# Patient Record
Sex: FEMALE | Race: BLACK OR AFRICAN AMERICAN | NOT HISPANIC OR LATINO | URBAN - METROPOLITAN AREA
[De-identification: names, ages, dates, MRNs, and addresses within clinical notes are randomized per-mention and may not be internally consistent; named-entity substitution may affect disease eponyms.]

---

## 2017-01-01 ENCOUNTER — OUTPATIENT (OUTPATIENT)
Dept: OUTPATIENT SERVICES | Facility: HOSPITAL | Age: 57
LOS: 1 days | End: 2017-01-01
Payer: MEDICAID

## 2017-01-31 DIAGNOSIS — R69 ILLNESS, UNSPECIFIED: ICD-10-CM

## 2017-07-01 PROCEDURE — G9001: CPT

## 2019-11-22 ENCOUNTER — EMERGENCY (EMERGENCY)
Facility: HOSPITAL | Age: 59
LOS: 0 days | Discharge: HOME | End: 2019-11-22
Payer: MEDICAID

## 2019-11-22 VITALS
TEMPERATURE: 98 F | OXYGEN SATURATION: 99 % | HEIGHT: 63 IN | DIASTOLIC BLOOD PRESSURE: 71 MMHG | HEART RATE: 87 BPM | WEIGHT: 154.98 LBS | SYSTOLIC BLOOD PRESSURE: 129 MMHG | RESPIRATION RATE: 16 BRPM
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
## 2019-11-22 DIAGNOSIS — S39.012A STRAIN OF MUSCLE, FASCIA AND TENDON OF LOWER BACK, INITIAL ENCOUNTER: ICD-10-CM

## 2019-11-22 DIAGNOSIS — Z98.891 HISTORY OF UTERINE SCAR FROM PREVIOUS SURGERY: Chronic | ICD-10-CM
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
## 2019-11-22 DIAGNOSIS — M54.5 LOW BACK PAIN: ICD-10-CM

## 2019-11-22 PROCEDURE — 99283 EMERGENCY DEPT VISIT LOW MDM: CPT

## 2019-11-22 RX ORDER — CYCLOBENZAPRINE HYDROCHLORIDE 10 MG/1
10 TABLET, FILM COATED ORAL ONCE
Refills: 0 | Status: COMPLETED | OUTPATIENT
Start: 2019-11-22 | End: 2019-11-22

## 2019-11-22 RX ORDER — IBUPROFEN 200 MG
600 TABLET ORAL ONCE
Refills: 0 | Status: COMPLETED | OUTPATIENT
Start: 2019-11-22 | End: 2019-11-22

## 2019-11-22 RX ORDER — CYCLOBENZAPRINE HYDROCHLORIDE 10 MG/1
1 TABLET, FILM COATED ORAL
Qty: 9 | Refills: 0
Start: 2019-11-22 | End: 2019-11-24

## 2019-11-22 RX ADMIN — Medication 600 MILLIGRAM(S): at 16:34

## 2019-11-22 RX ADMIN — CYCLOBENZAPRINE HYDROCHLORIDE 10 MILLIGRAM(S): 10 TABLET, FILM COATED ORAL at 16:34

## 2019-11-22 NOTE — ED PROVIDER NOTE - PATIENT PORTAL LINK FT
You can access the FollowMyHealth Patient Portal offered by [facility] by registering at the following website: [email or web address]. By joining [employer]’s FollowMyHealth portal, you will also be able to view your health information using other applications (apps) compatible with our system.

## 2019-11-22 NOTE — ED PROVIDER NOTE - CARDIAC, MLM
Normal rate, regular rhythm.  Heart sounds S1, S2.  No murmurs, rubs or gallops. 2+ symmetric upper and lower extremity pulses

## 2019-11-22 NOTE — ED PROVIDER NOTE - OBJECTIVE STATEMENT
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 She works as a home health aid. She reports since that time intermittent pain to the low back with twisting or bending. No lower extremity pain, numbness or weakness. Denies bowel or bladder dysfunction. Denies abdominal pain or chest pain. No shortness of breath or dizziness. She tried Tylenol for pain. She has been ambulatory.

## 2019-11-22 NOTE — ED PROVIDER NOTE - CLINICAL SUMMARY MEDICAL DECISION MAKING FREE TEXT BOX
Low back pain following heavy lifting. Pain is positional, suspect muscular strain. No midline tenderness to suggest pathologic fracture. Do not suspect intraabdominal process. Recommend course of NSAIDs, muscle relaxants, PMD f/u, reviewed reasons to return

## 2019-12-25 ENCOUNTER — EMERGENCY (EMERGENCY)
Facility: HOSPITAL | Age: 59
LOS: 0 days | Discharge: HOME | End: 2019-12-25
Attending: EMERGENCY MEDICINE
Payer: MEDICAID

## 2019-12-25 VITALS
SYSTOLIC BLOOD PRESSURE: 124 MMHG | TEMPERATURE: 98 F | HEART RATE: 81 BPM | RESPIRATION RATE: 16 BRPM | DIASTOLIC BLOOD PRESSURE: 74 MMHG | OXYGEN SATURATION: 98 %

## 2019-12-25 VITALS
RESPIRATION RATE: 17 BRPM | OXYGEN SATURATION: 98 % | SYSTOLIC BLOOD PRESSURE: 144 MMHG | HEIGHT: 60 IN | HEART RATE: 89 BPM | DIASTOLIC BLOOD PRESSURE: 84 MMHG | WEIGHT: 149.91 LBS | TEMPERATURE: 98 F

## 2019-12-25 DIAGNOSIS — H66.90 OTITIS MEDIA, UNSPECIFIED, UNSPECIFIED EAR: ICD-10-CM

## 2019-12-25 DIAGNOSIS — H92.01 OTALGIA, RIGHT EAR: ICD-10-CM

## 2019-12-25 DIAGNOSIS — Z98.891 HISTORY OF UTERINE SCAR FROM PREVIOUS SURGERY: Chronic | ICD-10-CM

## 2019-12-25 DIAGNOSIS — R51 HEADACHE: ICD-10-CM

## 2019-12-25 PROBLEM — Z78.9 OTHER SPECIFIED HEALTH STATUS: Chronic | Status: ACTIVE | Noted: 2019-11-22

## 2019-12-25 LAB — GLUCOSE BLDC GLUCOMTR-MCNC: 121 MG/DL — HIGH (ref 70–99)

## 2019-12-25 PROCEDURE — 70450 CT HEAD/BRAIN W/O DYE: CPT | Mod: 26

## 2019-12-25 PROCEDURE — 99284 EMERGENCY DEPT VISIT MOD MDM: CPT

## 2019-12-25 RX ORDER — ACETAMINOPHEN 500 MG
2 TABLET ORAL
Qty: 40 | Refills: 0
Start: 2019-12-25 | End: 2019-12-29

## 2019-12-25 RX ADMIN — Medication 1 TABLET(S): at 09:19

## 2019-12-25 NOTE — ED ADULT TRIAGE NOTE - CHIEF COMPLAINT QUOTE
Reports Pain to  crown of the head and  right ear pain since last night . took 2 tabs of Tylenol without any relief . denies Nausea , denies pmh

## 2019-12-25 NOTE — ED ADULT NURSE NOTE - ED STAT RN HANDOFF DETAILS
Report endorsed to oncoming RN Nimisha. Safety checks compld this shift/Safety rounds completed. Fall +skin precs in place. Any issues endorsed to oncoming RN for follow up. awaiting MD ruiz

## 2019-12-25 NOTE — ED PROVIDER NOTE - OBJECTIVE STATEMENT
59 year old female with no significant PMH presenting to ED due to headache, and some ear ache x 2 days, no fever/chills no difficulty hearing thought R ear feels muffled. States no focal neurological deficits.

## 2019-12-25 NOTE — ED PROVIDER NOTE - ENMT, MLM
Airway patent, Nasal mucosa clear. Mouth with normal mucosa. Throat has no vesicles, no oropharyngeal exudates and uvula is midline. TM on R with clouding and otherwise not erythematous, fluid noted

## 2019-12-25 NOTE — ED ADULT NURSE NOTE - OBJECTIVE STATEMENT
pt received to bed 2 c/o intermittent pain in top of her head starting yesterday. pt states "when the pain comes, it goes to my right ear." denies: head injury, fall, n/v, blurry vision. pain unrelieved by tylenol taken at home

## 2019-12-25 NOTE — ED ADULT NURSE REASSESSMENT NOTE - NS ED NURSE REASSESS COMMENT FT1
received pt to bed 29 alert and oriented times 4. pt states he still has right thigh pain, however refusing pain medication at this time. all radiology testing done. pt awaiting results of xray. plan of care explained to pt who verbalized understanding. received pt to bed 2 awake alert oriented. pt complaining of right ear pain and headache. pt awaiting evaluation by md.

## 2020-01-27 NOTE — ED PROVIDER NOTE - MUSCULOSKELETAL, MLM
Labs WNL Spine appears normal, range of motion is not limited, no midline cervical thoracic or lumbar tenderness

## 2020-02-28 NOTE — ED PROVIDER NOTE - NEUROLOGICAL, MLM
"Patient is calling and is requesting an appointment.  Mary Anne went to urgency room and given two antibiotics.  Mary Anne was seen on 2/25/ by Raven Silva and is having numbness and pain in hands.  Antibiotic given by urgency room should not be taken if lupus is present.  Today hands and head hurts so bad and is requesting an appointment to calm down symptoms.  No symptoms since 2011.  Now given an antibiotic that she should not take.  Medication she was prescribed is  Sulfamethoxazole-trimethoprim and cefdinir and is stopping both medications today.  Mary Anne states that she feels she needs a steroid prescribed today.  Mary Anne is requesting to be seen today or tomorrow.      Reason for Disposition    [1] MODERATE pain (e.g., interferes with normal activities) AND [2] present > 3 days    Additional Information    Negative: [1] Similar pain previously AND [2] it was from \"heart attack\"    Negative: [1] Similar pain previously AND [2] it was from \"angina\" AND [3] not relieved by nitroglycerin    Negative: Sounds like a life-threatening emergency to the triager    Negative: [1] Swollen joint AND [2] fever    Negative: [1] Red area or streak AND [2] fever    Negative: Patient sounds very sick or weak to the triager    Negative: [1] SEVERE pain (e.g., excruciating, unable to use hand at all) AND [2] not improved after 2 hours of pain medicine    Negative: [1] Looks infected (spreading redness, pus) AND [2] large red area (> 2 in. or 5 cm)    Negative: Weakness (i.e., loss of strength) of new onset in hand or fingers  (Exceptions: not truly weak, hand feels weak because of pain; weakness present > 2 weeks)    Negative: Numbness (i.e., loss of sensation) in hand or fingers (Exception: just tingling; numbness present > 2 weeks)    Negative: Looks like a boil, infected sore, deep ulcer or other infected rash (spreading redness, pus)    Negative: [1] Localized rash is very painful AND [2] no fever    Protocols used: HAND AND " WRIST PAIN-A-AH       Alert and oriented, no focal deficits, no motor or sensory deficits.

## 2020-10-01 ENCOUNTER — EMERGENCY (EMERGENCY)
Facility: HOSPITAL | Age: 60
LOS: 0 days | Discharge: HOME | End: 2020-10-01
Attending: STUDENT IN AN ORGANIZED HEALTH CARE EDUCATION/TRAINING PROGRAM
Payer: MEDICAID

## 2020-10-01 VITALS
TEMPERATURE: 98 F | DIASTOLIC BLOOD PRESSURE: 79 MMHG | HEIGHT: 60 IN | SYSTOLIC BLOOD PRESSURE: 142 MMHG | HEART RATE: 83 BPM | OXYGEN SATURATION: 98 % | RESPIRATION RATE: 20 BRPM

## 2020-10-01 VITALS
OXYGEN SATURATION: 99 % | HEART RATE: 79 BPM | RESPIRATION RATE: 21 BRPM | DIASTOLIC BLOOD PRESSURE: 81 MMHG | TEMPERATURE: 99 F | SYSTOLIC BLOOD PRESSURE: 141 MMHG

## 2020-10-01 DIAGNOSIS — M54.9 DORSALGIA, UNSPECIFIED: ICD-10-CM

## 2020-10-01 DIAGNOSIS — Z98.891 HISTORY OF UTERINE SCAR FROM PREVIOUS SURGERY: Chronic | ICD-10-CM

## 2020-10-01 DIAGNOSIS — Z87.59 PERSONAL HISTORY OF OTHER COMPLICATIONS OF PREGNANCY, CHILDBIRTH AND THE PUERPERIUM: ICD-10-CM

## 2020-10-01 LAB
ALBUMIN SERPL ELPH-MCNC: 3.6 G/DL — SIGNIFICANT CHANGE UP (ref 3.3–5)
ALP SERPL-CCNC: 61 U/L — SIGNIFICANT CHANGE UP (ref 40–120)
ALT FLD-CCNC: 32 U/L — SIGNIFICANT CHANGE UP (ref 12–78)
ANION GAP SERPL CALC-SCNC: 5 MMOL/L — SIGNIFICANT CHANGE UP (ref 5–17)
APPEARANCE UR: CLEAR — SIGNIFICANT CHANGE UP
APTT BLD: 36.8 SEC — HIGH (ref 27.5–35.5)
AST SERPL-CCNC: 22 U/L — SIGNIFICANT CHANGE UP (ref 15–37)
BILIRUB SERPL-MCNC: 0.2 MG/DL — SIGNIFICANT CHANGE UP (ref 0.2–1.2)
BILIRUB UR-MCNC: NEGATIVE — SIGNIFICANT CHANGE UP
BUN SERPL-MCNC: 11 MG/DL — SIGNIFICANT CHANGE UP (ref 7–23)
CALCIUM SERPL-MCNC: 8.7 MG/DL — SIGNIFICANT CHANGE UP (ref 8.5–10.1)
CHLORIDE SERPL-SCNC: 105 MMOL/L — SIGNIFICANT CHANGE UP (ref 96–108)
CO2 SERPL-SCNC: 30 MMOL/L — SIGNIFICANT CHANGE UP (ref 22–31)
COLOR SPEC: YELLOW — SIGNIFICANT CHANGE UP
CREAT SERPL-MCNC: 0.82 MG/DL — SIGNIFICANT CHANGE UP (ref 0.5–1.3)
DIFF PNL FLD: NEGATIVE — SIGNIFICANT CHANGE UP
GLUCOSE SERPL-MCNC: 98 MG/DL — SIGNIFICANT CHANGE UP (ref 70–99)
GLUCOSE UR QL: NEGATIVE MG/DL — SIGNIFICANT CHANGE UP
HCT VFR BLD CALC: 36.5 % — SIGNIFICANT CHANGE UP (ref 34.5–45)
HGB BLD-MCNC: 11.8 G/DL — SIGNIFICANT CHANGE UP (ref 11.5–15.5)
INR BLD: 0.95 RATIO — SIGNIFICANT CHANGE UP (ref 0.88–1.16)
KETONES UR-MCNC: NEGATIVE — SIGNIFICANT CHANGE UP
LEUKOCYTE ESTERASE UR-ACNC: NEGATIVE — SIGNIFICANT CHANGE UP
LIDOCAIN IGE QN: 139 U/L — SIGNIFICANT CHANGE UP (ref 73–393)
MCHC RBC-ENTMCNC: 26.7 PG — LOW (ref 27–34)
MCHC RBC-ENTMCNC: 32.3 GM/DL — SIGNIFICANT CHANGE UP (ref 32–36)
MCV RBC AUTO: 82.6 FL — SIGNIFICANT CHANGE UP (ref 80–100)
NITRITE UR-MCNC: NEGATIVE — SIGNIFICANT CHANGE UP
NRBC # BLD: 0 /100 WBCS — SIGNIFICANT CHANGE UP (ref 0–0)
PH UR: 6 — SIGNIFICANT CHANGE UP (ref 5–8)
PLATELET # BLD AUTO: 202 K/UL — SIGNIFICANT CHANGE UP (ref 150–400)
POTASSIUM SERPL-MCNC: 3.9 MMOL/L — SIGNIFICANT CHANGE UP (ref 3.5–5.3)
POTASSIUM SERPL-SCNC: 3.9 MMOL/L — SIGNIFICANT CHANGE UP (ref 3.5–5.3)
PROT SERPL-MCNC: 7.4 GM/DL — SIGNIFICANT CHANGE UP (ref 6–8.3)
PROT UR-MCNC: NEGATIVE MG/DL — SIGNIFICANT CHANGE UP
PROTHROM AB SERPL-ACNC: 11.1 SEC — SIGNIFICANT CHANGE UP (ref 10.6–13.6)
RBC # BLD: 4.42 M/UL — SIGNIFICANT CHANGE UP (ref 3.8–5.2)
RBC # FLD: 13.4 % — SIGNIFICANT CHANGE UP (ref 10.3–14.5)
SODIUM SERPL-SCNC: 140 MMOL/L — SIGNIFICANT CHANGE UP (ref 135–145)
SP GR SPEC: 1.01 — SIGNIFICANT CHANGE UP (ref 1.01–1.02)
TROPONIN I SERPL-MCNC: <.015 NG/ML — SIGNIFICANT CHANGE UP (ref 0.01–0.04)
UROBILINOGEN FLD QL: NEGATIVE MG/DL — SIGNIFICANT CHANGE UP
WBC # BLD: 5.38 K/UL — SIGNIFICANT CHANGE UP (ref 3.8–10.5)
WBC # FLD AUTO: 5.38 K/UL — SIGNIFICANT CHANGE UP (ref 3.8–10.5)

## 2020-10-01 PROCEDURE — 99284 EMERGENCY DEPT VISIT MOD MDM: CPT

## 2020-10-01 PROCEDURE — 71045 X-RAY EXAM CHEST 1 VIEW: CPT | Mod: 26

## 2020-10-01 RX ORDER — SODIUM CHLORIDE 9 MG/ML
1000 INJECTION INTRAMUSCULAR; INTRAVENOUS; SUBCUTANEOUS ONCE
Refills: 0 | Status: COMPLETED | OUTPATIENT
Start: 2020-10-01 | End: 2020-10-01

## 2020-10-01 RX ADMIN — SODIUM CHLORIDE 1000 MILLILITER(S): 9 INJECTION INTRAMUSCULAR; INTRAVENOUS; SUBCUTANEOUS at 18:07

## 2020-10-01 NOTE — ED PROVIDER NOTE - PATIENT PORTAL LINK FT
You can access the FollowMyHealth Patient Portal offered by [facility] by registering at the following website: [email or web address]. By joining [employer]’s FollowMyHealth portal, you will also be able to view your health information using other applications (apps) compatible with our system.

## 2020-10-01 NOTE — ED PROVIDER NOTE - CARE PROVIDER_API CALL
Sergio Sevilla (DO)  Orthopaedic Surgery  [address] [place] [address]  Phone: [phone]  Fax: [phone]  Follow Up Time:

## 2020-10-01 NOTE — ED ADULT NURSE NOTE - OBJECTIVE STATEMENT
Pt received in bed alert and oriented with the c/o intermittent upper back pain that radiates around to mid chest pt states that this pain started back in February but because of covid she delayed obtaining medical care. Pt states that the same is happening in her lower back. As per Md's orders IV med lock placed blood specimen obtained and sent to the lab. Pt stable and resting in bed. Nursing care ongoing and safety maintained.

## 2020-10-01 NOTE — ED PROVIDER NOTE - CLINICAL SUMMARY MEDICAL DECISION MAKING FREE TEXT BOX
no ttp in ED. no chest pain or trouble breathing. no midline back pain or signs of neurologic compromise. no hematuria indicative of renal stone. will d/c with ortho follow up

## 2020-10-01 NOTE — ED ADULT TRIAGE NOTE - CHIEF COMPLAINT QUOTE
pt c/o pain from mid upper back radiating to the chest from february , getting worse now, denies sob

## 2020-10-01 NOTE — ED PROVIDER NOTE - OBJECTIVE STATEMENT
60f no pmhx. 7-8 months of bilateral back pain radiating around to her sides. was told she has back muscle/spiune issues and was referred to a spinal surgeon but has yet to follow up. no recnety worsening of chronic symptoms. no numbness or weakness. no change in urinary habits. no cp or toruble breathing. no abd pain

## 2021-09-14 NOTE — ED PROVIDER NOTE - PRINCIPAL DIAGNOSIS
PRE-OP DIAGNOSIS:  Fracture dislocation of elbow joint 14-Sep-2021 14:36:26  Steven Martell   Otitis media

## 2023-05-12 ENCOUNTER — EMERGENCY (EMERGENCY)
Facility: HOSPITAL | Age: 63
LOS: 0 days | Discharge: HOME | End: 2023-05-13
Attending: STUDENT IN AN ORGANIZED HEALTH CARE EDUCATION/TRAINING PROGRAM
Payer: MEDICAID

## 2023-05-12 VITALS
DIASTOLIC BLOOD PRESSURE: 74 MMHG | HEART RATE: 80 BPM | RESPIRATION RATE: 18 BRPM | HEIGHT: 63 IN | OXYGEN SATURATION: 98 % | WEIGHT: 149.91 LBS | SYSTOLIC BLOOD PRESSURE: 122 MMHG | TEMPERATURE: 98 F

## 2023-05-12 DIAGNOSIS — M25.551 PAIN IN RIGHT HIP: ICD-10-CM

## 2023-05-12 DIAGNOSIS — R30.0 DYSURIA: ICD-10-CM

## 2023-05-12 DIAGNOSIS — R10.30 LOWER ABDOMINAL PAIN, UNSPECIFIED: ICD-10-CM

## 2023-05-12 DIAGNOSIS — R73.03 PREDIABETES: ICD-10-CM

## 2023-05-12 DIAGNOSIS — M54.50 LOW BACK PAIN, UNSPECIFIED: ICD-10-CM

## 2023-05-12 DIAGNOSIS — Z98.891 HISTORY OF UTERINE SCAR FROM PREVIOUS SURGERY: Chronic | ICD-10-CM

## 2023-05-12 DIAGNOSIS — Z98.890 OTHER SPECIFIED POSTPROCEDURAL STATES: ICD-10-CM

## 2023-05-12 LAB
APPEARANCE UR: CLEAR — SIGNIFICANT CHANGE UP
BILIRUB UR-MCNC: NEGATIVE — SIGNIFICANT CHANGE UP
COLOR SPEC: YELLOW — SIGNIFICANT CHANGE UP
DIFF PNL FLD: NEGATIVE — SIGNIFICANT CHANGE UP
GLUCOSE UR QL: NEGATIVE MG/DL — SIGNIFICANT CHANGE UP
KETONES UR-MCNC: NEGATIVE — SIGNIFICANT CHANGE UP
LEUKOCYTE ESTERASE UR-ACNC: ABNORMAL
NITRITE UR-MCNC: NEGATIVE — SIGNIFICANT CHANGE UP
PH UR: 7 — SIGNIFICANT CHANGE UP (ref 5–8)
PROT UR-MCNC: 15 MG/DL
SP GR SPEC: 1.01 — SIGNIFICANT CHANGE UP (ref 1.01–1.02)
UROBILINOGEN FLD QL: 1 MG/DL

## 2023-05-12 PROCEDURE — 99284 EMERGENCY DEPT VISIT MOD MDM: CPT

## 2023-05-12 RX ORDER — KETOROLAC TROMETHAMINE 30 MG/ML
30 SYRINGE (ML) INJECTION ONCE
Refills: 0 | Status: DISCONTINUED | OUTPATIENT
Start: 2023-05-12 | End: 2023-05-12

## 2023-05-12 RX ORDER — LIDOCAINE 4 G/100G
2 CREAM TOPICAL ONCE
Refills: 0 | Status: COMPLETED | OUTPATIENT
Start: 2023-05-12 | End: 2023-05-12

## 2023-05-12 RX ORDER — METHOCARBAMOL 500 MG/1
1500 TABLET, FILM COATED ORAL ONCE
Refills: 0 | Status: COMPLETED | OUTPATIENT
Start: 2023-05-12 | End: 2023-05-12

## 2023-05-12 RX ADMIN — LIDOCAINE 2 PATCH: 4 CREAM TOPICAL at 23:08

## 2023-05-12 RX ADMIN — Medication 30 MILLIGRAM(S): at 23:07

## 2023-05-12 RX ADMIN — Medication 30 MILLIGRAM(S): at 23:22

## 2023-05-12 NOTE — ED PROVIDER NOTE - PHYSICAL EXAMINATION
PE:   GEN: Awake, alert, interactive, NAD, non-toxic appearing.   HEAD: Atraumatic  EYES: Sclera white, conjunctiva pink, PERRLA  CARDIAC: Reg rate and rhythm, S1,S2, no murmur/rub/gallop. Strong central and peripheral pulses, Brisk cap refill, no evident pedal edema.   RESP: No distress noted. L/S clear = Bilat without accessory muscle use, wheeze, rhonchi, rales.   ABD: soft, supple, mild suprapubic TTP, no guarding. BS x 4, normoactive.   NEURO: AOx3, CN II-XII grossly intact without focal deficit.   MSK: Moving all extremities with no apparent deformities. pelvis stable, no midline c/t/l spine TTP , + lateral R hip TTP  SKIN: Warm, dry, normal color, without apparent rashes.

## 2023-05-12 NOTE — ED PROVIDER NOTE - CARE PROVIDER_API CALL
Teodoro Hampton (DO)  Orthopaedic Surgery  [address] [place] [address]  Phone: [phone]  Fax: [phone]  Follow Up Time: 1-3 Days

## 2023-05-12 NOTE — ED PROVIDER NOTE - NS ED ATTENDING STATEMENT MOD
This was a shared visit with the GILA. I reviewed and verified the documentation and independently performed the documented:

## 2023-05-12 NOTE — ED ADULT TRIAGE NOTE - CHIEF COMPLAINT QUOTE
Patient c/o right lower back pain/ right hip pain that radiates down right leg for months, worsening last 3 days and dysuria. Pmh arthritis.

## 2023-05-12 NOTE — ED PROVIDER NOTE - CLINICAL SUMMARY MEDICAL DECISION MAKING FREE TEXT BOX
63 y/o f with hx arthritis, prediabetes, and c section presents to ED for c/o R hip / lower back pain x 3 days denies injury trauma or fall, also with dysuira and suprapubic pain, on exam pt in NAD, no CVAT, nontoxic, mild suprapubic tenderness, point tenderness over R lateral hip, no midline spine ttp, impression: likely MSK, will r/o UTI will give muscle relaxers and antiinflammatory

## 2023-05-12 NOTE — ED PROVIDER NOTE - PATIENT PORTAL LINK FT
You can access the FollowMyHealth Patient Portal offered by [facility] by registering at the following website: [email or web address]. By joining [employer]’s FollowMyHealth portal, you will also be able to view your health information using other applications (apps) compatible with our system.

## 2023-05-12 NOTE — ED PROVIDER NOTE - NSFOLLOWUPINSTRUCTIONS_ED_ALL_ED_FT
You were seen in the ED for back and hip discomfort.    This is likely from a muscle.    You can take robaxin every 6 hours as needed for pain in addition to tylenol.    Follow up with orthopedics if your symptoms continue.    Back Pain    Back pain is very common in adults. The cause of back pain is rarely dangerous and the pain often gets better over time. The cause of your back pain may not be known and may include strain of muscles or ligaments, degeneration of the spinal disks, or arthritis. Occasionally the pain may radiate down your leg(s). Over-the-counter medicines to reduce pain and inflammation are often the most helpful. Stretching and remaining active frequently helps the healing process.     SEEK IMMEDIATE MEDICAL CARE IF YOU HAVE ANY OF THE FOLLOWING SYMPTOMS: bowel or bladder control problems, unusual weakness or numbness in your arms or legs, nausea or vomiting, abdominal pain, fever, dizziness/lightheadedness.

## 2023-05-12 NOTE — ED ADULT NURSE NOTE - OBJECTIVE STATEMENT
Pt AOx4 and ambulatory with steady gait at baseline. PMH Arthritis. Pt c/o R hip pain x5 days worsening over last 3 days. Pt reports pain radiates to back and pelvis at times. Pt reports she "feels like something gripping on R hip." Pt denies any recent falls, trauma, blood thinner use, recent heavy lifting, or LOC. Pt states last pain med taken was about 2 days ago. Pt denies SOB, N/V/D, fever/chills, chest pain, or dysuria.

## 2023-05-12 NOTE — ED PROVIDER NOTE - OBJECTIVE STATEMENT
61 y/o f with pmh "prediabetes", arthritis, and  presents to ED for c/o R hip/ lumbar spine pain worse x 3 days. Also with dysuria and suprapubic pain. No fever chills n/v/d. States taking tylenol with little relief. Denies any trauma injury or fall.

## 2023-05-12 NOTE — ED PROVIDER NOTE - ATTENDING APP SHARED VISIT CONTRIBUTION OF CARE
61 y/o F with PMH  "prediabetes", arthritis, and  presents to ED for c/o R hip/ lumbar spine pain worse x 3 days. Reports pain that radiates from the R sided flank/lower back into her abdomen. She states her abdomen feels "uncomfortable" at times but the pain is mostly positional. She denies fever, chills, N/V/D. Reports she only uses tylenol for pain and it is not helping much. On arrival, vitals stable. She is well appearing in NAD. She has no focal CVA tenderness. Minimal suprapubic tenderness. Plan for UA and CT to r/o stone vs other pathology, although suspect MSK related pain as it worsens with movement.

## 2023-05-12 NOTE — ED ADULT NURSE NOTE - NSFALLUNIVINTERV_ED_ALL_ED
Bed/Stretcher in lowest position, wheels locked, appropriate side rails in place/Call bell, personal items and telephone in reach/Instruct patient to call for assistance before getting out of bed/chair/stretcher/Non-slip footwear applied when patient is off stretcher/[place] to call system/Physically safe environment - no spills, clutter or unnecessary equipment/Purposeful proactive rounding/Room/bathroom lighting operational, light cord in reach

## 2023-05-13 VITALS
HEART RATE: 77 BPM | TEMPERATURE: 98 F | RESPIRATION RATE: 19 BRPM | SYSTOLIC BLOOD PRESSURE: 125 MMHG | OXYGEN SATURATION: 99 % | DIASTOLIC BLOOD PRESSURE: 74 MMHG

## 2023-05-13 LAB
BACTERIA # UR AUTO: ABNORMAL
EPI CELLS # UR: SIGNIFICANT CHANGE UP
RBC CASTS # UR COMP ASSIST: NEGATIVE /HPF — SIGNIFICANT CHANGE UP (ref 0–4)
WBC UR QL: SIGNIFICANT CHANGE UP

## 2023-05-13 PROCEDURE — 74176 CT ABD & PELVIS W/O CONTRAST: CPT | Mod: 26,MA

## 2023-05-13 RX ORDER — ACETAMINOPHEN 500 MG
975 TABLET ORAL ONCE
Refills: 0 | Status: COMPLETED | OUTPATIENT
Start: 2023-05-13 | End: 2023-05-13

## 2023-05-13 RX ORDER — METHOCARBAMOL 500 MG/1
1 TABLET, FILM COATED ORAL
Qty: 12 | Refills: 0
Start: 2023-05-13 | End: 2023-05-15

## 2023-05-13 RX ORDER — METHOCARBAMOL 500 MG/1
500 TABLET, FILM COATED ORAL ONCE
Refills: 0 | Status: COMPLETED | OUTPATIENT
Start: 2023-05-13 | End: 2023-05-13

## 2023-05-13 RX ORDER — TRAMADOL HYDROCHLORIDE 50 MG/1
50 TABLET ORAL ONCE
Refills: 0 | Status: DISCONTINUED | OUTPATIENT
Start: 2023-05-13 | End: 2023-05-13

## 2023-05-13 RX ADMIN — METHOCARBAMOL 500 MILLIGRAM(S): 500 TABLET, FILM COATED ORAL at 02:17

## 2023-05-13 RX ADMIN — Medication 975 MILLIGRAM(S): at 02:17

## 2023-05-13 NOTE — ED ADULT NURSE REASSESSMENT NOTE - NS ED NURSE REASSESS COMMENT FT1
Pt initially refused Robaxin 1500mg when ordered stating she did not want to take PO pain medications other than Tylenol. Pt reported that she has hx of peptic ulcer. Pt made aware that Robaxin would not worsen Peptic ulcer but continued to refuse at 23:00. At this time, pt reports she is willing to take Robaxin as ordered by MD. Pt to be dcd soon, pt reports she is not driving home and will have someone pick her up. Safety measures maintained.

## 2023-05-15 LAB
CULTURE RESULTS: SIGNIFICANT CHANGE UP
SPECIMEN SOURCE: SIGNIFICANT CHANGE UP

## 2023-08-27 ENCOUNTER — EMERGENCY (EMERGENCY)
Facility: HOSPITAL | Age: 63
LOS: 0 days | Discharge: HOME | End: 2023-08-27
Attending: STUDENT IN AN ORGANIZED HEALTH CARE EDUCATION/TRAINING PROGRAM
Payer: MEDICAID

## 2023-08-27 VITALS
TEMPERATURE: 98 F | HEART RATE: 74 BPM | RESPIRATION RATE: 16 BRPM | SYSTOLIC BLOOD PRESSURE: 131 MMHG | OXYGEN SATURATION: 100 % | DIASTOLIC BLOOD PRESSURE: 61 MMHG

## 2023-08-27 VITALS
HEART RATE: 90 BPM | WEIGHT: 149.91 LBS | DIASTOLIC BLOOD PRESSURE: 69 MMHG | OXYGEN SATURATION: 97 % | HEIGHT: 60 IN | TEMPERATURE: 98 F | RESPIRATION RATE: 17 BRPM | SYSTOLIC BLOOD PRESSURE: 135 MMHG

## 2023-08-27 DIAGNOSIS — I10 ESSENTIAL (PRIMARY) HYPERTENSION: ICD-10-CM

## 2023-08-27 DIAGNOSIS — Z98.891 HISTORY OF UTERINE SCAR FROM PREVIOUS SURGERY: Chronic | ICD-10-CM

## 2023-08-27 DIAGNOSIS — R42 DIZZINESS AND GIDDINESS: ICD-10-CM

## 2023-08-27 DIAGNOSIS — R11.0 NAUSEA: ICD-10-CM

## 2023-08-27 LAB
ALBUMIN SERPL ELPH-MCNC: 3.5 G/DL — SIGNIFICANT CHANGE UP (ref 3.3–5)
ALP SERPL-CCNC: 59 U/L — SIGNIFICANT CHANGE UP (ref 40–120)
ALT FLD-CCNC: 24 U/L — SIGNIFICANT CHANGE UP (ref 12–78)
ANION GAP SERPL CALC-SCNC: 3 MMOL/L — LOW (ref 5–17)
AST SERPL-CCNC: 16 U/L — SIGNIFICANT CHANGE UP (ref 15–37)
BASOPHILS # BLD AUTO: 0.03 K/UL — SIGNIFICANT CHANGE UP (ref 0–0.2)
BASOPHILS NFR BLD AUTO: 0.6 % — SIGNIFICANT CHANGE UP (ref 0–2)
BILIRUB SERPL-MCNC: 0.3 MG/DL — SIGNIFICANT CHANGE UP (ref 0.2–1.2)
BUN SERPL-MCNC: 16 MG/DL — SIGNIFICANT CHANGE UP (ref 7–23)
CALCIUM SERPL-MCNC: 8.6 MG/DL — SIGNIFICANT CHANGE UP (ref 8.5–10.1)
CHLORIDE SERPL-SCNC: 109 MMOL/L — HIGH (ref 96–108)
CO2 SERPL-SCNC: 30 MMOL/L — SIGNIFICANT CHANGE UP (ref 22–31)
CREAT SERPL-MCNC: 0.73 MG/DL — SIGNIFICANT CHANGE UP (ref 0.5–1.3)
EGFR: 92 ML/MIN/1.73M2 — SIGNIFICANT CHANGE UP
EOSINOPHIL # BLD AUTO: 0.03 K/UL — SIGNIFICANT CHANGE UP (ref 0–0.5)
EOSINOPHIL NFR BLD AUTO: 0.6 % — SIGNIFICANT CHANGE UP (ref 0–6)
GLUCOSE SERPL-MCNC: 126 MG/DL — HIGH (ref 70–99)
HCT VFR BLD CALC: 36.3 % — SIGNIFICANT CHANGE UP (ref 34.5–45)
HGB BLD-MCNC: 11.6 G/DL — SIGNIFICANT CHANGE UP (ref 11.5–15.5)
IMM GRANULOCYTES NFR BLD AUTO: 0.2 % — SIGNIFICANT CHANGE UP (ref 0–0.9)
LYMPHOCYTES # BLD AUTO: 1.43 K/UL — SIGNIFICANT CHANGE UP (ref 1–3.3)
LYMPHOCYTES # BLD AUTO: 26.6 % — SIGNIFICANT CHANGE UP (ref 13–44)
MAGNESIUM SERPL-MCNC: 2.3 MG/DL — SIGNIFICANT CHANGE UP (ref 1.6–2.6)
MCHC RBC-ENTMCNC: 26.9 PG — LOW (ref 27–34)
MCHC RBC-ENTMCNC: 32 G/DL — SIGNIFICANT CHANGE UP (ref 32–36)
MCV RBC AUTO: 84.2 FL — SIGNIFICANT CHANGE UP (ref 80–100)
MONOCYTES # BLD AUTO: 0.41 K/UL — SIGNIFICANT CHANGE UP (ref 0–0.9)
MONOCYTES NFR BLD AUTO: 7.6 % — SIGNIFICANT CHANGE UP (ref 2–14)
NEUTROPHILS # BLD AUTO: 3.47 K/UL — SIGNIFICANT CHANGE UP (ref 1.8–7.4)
NEUTROPHILS NFR BLD AUTO: 64.4 % — SIGNIFICANT CHANGE UP (ref 43–77)
NRBC # BLD: 0 /100 WBCS — SIGNIFICANT CHANGE UP (ref 0–0)
PLATELET # BLD AUTO: 212 K/UL — SIGNIFICANT CHANGE UP (ref 150–400)
POTASSIUM SERPL-MCNC: 4.2 MMOL/L — SIGNIFICANT CHANGE UP (ref 3.5–5.3)
POTASSIUM SERPL-SCNC: 4.2 MMOL/L — SIGNIFICANT CHANGE UP (ref 3.5–5.3)
PROT SERPL-MCNC: 7 GM/DL — SIGNIFICANT CHANGE UP (ref 6–8.3)
RBC # BLD: 4.31 M/UL — SIGNIFICANT CHANGE UP (ref 3.8–5.2)
RBC # FLD: 13.5 % — SIGNIFICANT CHANGE UP (ref 10.3–14.5)
SODIUM SERPL-SCNC: 142 MMOL/L — SIGNIFICANT CHANGE UP (ref 135–145)
TROPONIN I, HIGH SENSITIVITY RESULT: 3.3 NG/L — SIGNIFICANT CHANGE UP
WBC # BLD: 5.38 K/UL — SIGNIFICANT CHANGE UP (ref 3.8–10.5)
WBC # FLD AUTO: 5.38 K/UL — SIGNIFICANT CHANGE UP (ref 3.8–10.5)

## 2023-08-27 PROCEDURE — G1004: CPT

## 2023-08-27 PROCEDURE — 99284 EMERGENCY DEPT VISIT MOD MDM: CPT

## 2023-08-27 PROCEDURE — 70450 CT HEAD/BRAIN W/O DYE: CPT | Mod: 26,MG

## 2023-08-27 PROCEDURE — 93010 ELECTROCARDIOGRAM REPORT: CPT

## 2023-08-27 RX ORDER — MECLIZINE HCL 12.5 MG
25 TABLET ORAL ONCE
Refills: 0 | Status: COMPLETED | OUTPATIENT
Start: 2023-08-27 | End: 2023-08-27

## 2023-08-27 RX ORDER — MECLIZINE HCL 12.5 MG
1 TABLET ORAL
Qty: 14 | Refills: 0
Start: 2023-08-27 | End: 2023-09-02

## 2023-08-27 RX ORDER — SODIUM CHLORIDE 9 MG/ML
1000 INJECTION INTRAMUSCULAR; INTRAVENOUS; SUBCUTANEOUS ONCE
Refills: 0 | Status: COMPLETED | OUTPATIENT
Start: 2023-08-27 | End: 2023-08-27

## 2023-08-27 RX ADMIN — Medication 25 MILLIGRAM(S): at 07:57

## 2023-08-27 RX ADMIN — SODIUM CHLORIDE 1000 MILLILITER(S): 9 INJECTION INTRAMUSCULAR; INTRAVENOUS; SUBCUTANEOUS at 07:30

## 2023-08-27 RX ADMIN — SODIUM CHLORIDE 1000 MILLILITER(S): 9 INJECTION INTRAMUSCULAR; INTRAVENOUS; SUBCUTANEOUS at 05:31

## 2023-08-27 NOTE — ED ADULT NURSE NOTE - ED STAT RN HANDOFF DETAILS
Report received from [employer] cleaning. Safety checks compld this shift/Safety rounds completed hourly.  IV sites checked Q2+remains WDL. Meds given as ord with no s/s of adverse RXNs. Fall +skin precs in place. on cardiac monitor, no acute distress noted.

## 2023-08-27 NOTE — ED ADULT TRIAGE NOTE - CHIEF COMPLAINT QUOTE
weakness x 2 months , feels dizzy and headache, pain back of the head , tingling sensation whole body.

## 2023-08-27 NOTE — ED PROVIDER NOTE - OBJECTIVE STATEMENT
64 yo F pmh htn here with episode of sudden dizziness with positional change and nausea without vomiting. Pt initially on exam demands bottles of water first before willing to participate in interview on exam.   Pt denies head trauma or fall. Pt ambulated into ED. No vision change or focal weakness. No known hx of vertigo. Denies ear ringing or recent uri.

## 2023-08-27 NOTE — ED ADULT NURSE NOTE - OBJECTIVE STATEMENT
Pt alert and oriented x4, c/o dizziness and headache starting this morning. States that she has been feeling weakness and numbness in her body for 2 months. Denies any PMH. Denies N/V, SOB, chest pain, abdominal pain, fevers, chills, dysuria, back pain. NKDA. Placed on monitor, NSR. Speech is clear, no facial droop, moving all extremities, ambulates with steady gait.

## 2023-08-27 NOTE — ED PROVIDER NOTE - CLINICAL SUMMARY MEDICAL DECISION MAKING FREE TEXT BOX
Pt reports dizziness on standing. NO syncope. No focal neurologic deficits. On eval pt requesting I come back after she drinks water. No vision change, headache. On reevaluation pt reports improvement with ivf. labs/cth neg. EOMI, face symmetric, finger to nose intact b/l, equal b/l hand  strength. 5/5 strength UE/LE b/l. Will dose meclizine for peripheral sx and give neuro f/u. Pt reports dizziness on standing. NO syncope. No focal neurologic deficits.  No vision change, headache. On reevaluation pt reports improvement with ivf. labs/cth neg. EOMI, face symmetric, finger to nose intact b/l, equal b/l hand  strength. 5/5 strength UE/LE b/l. Will dose meclizine for peripheral sx and give neuro f/u.

## 2023-08-27 NOTE — ED ADULT NURSE NOTE - NSFALLRISKASMT_ED_ALL_ED_DT
Problem: Pain, Acute (Adult)  Goal: Identify Related Risk Factors and Signs and Symptoms  Outcome: Ongoing (interventions implemented as appropriate)         27-Aug-2023 03:22

## 2023-08-27 NOTE — ED ADULT NURSE NOTE - CHPI ED NUR SYMPTOMS NEG
no blurred vision/no change in level of consciousness/no confusion/no fever/no loss of consciousness/no nausea/no vomiting

## 2023-08-27 NOTE — ED PROVIDER NOTE - PATIENT PORTAL LINK FT
You can access the FollowMyHealth Patient Portal offered by [facility] by registering at the following website: [email or web address]. By joining [employer]’s FollowMyHealth portal, you will also be able to view your health information using other applications (apps) compatible with our system.

## 2023-08-27 NOTE — ED ADULT NURSE NOTE - NSFALLRISKINTERV_ED_ALL_ED

## 2023-08-27 NOTE — ED ADULT NURSE NOTE - HOW OFTEN DO YOU HAVE A DRINK CONTAINING ALCOHOL?
"  Reason for Disposition   Patient wants to be seen    Protocols used: ST CHEST PAIN-A-OH    Erendira is calling to request an appointment with Dr. Gallardo.  Erendira reports that he is getting "winded" when walking around.  Also reports swelling around ankles.  Protocol states see in office today.  Please contact Erendira to advise. Erendira advised he can be reached on his cell phone.  "
Never

## 2023-08-27 NOTE — ED PROVIDER NOTE - CARE PROVIDER_API CALL
Patrick Moore  Neurology  [address], [place] [id number]  Phone: [phone]  Fax: [phone]  Follow Up Time: 1-3 Days

## 2023-08-27 NOTE — ED PROVIDER NOTE - PHYSICAL EXAMINATION
General: well appearing sitting up in stretcher comfortably  HEENT: NC/AT, MMM, PERRL, EOMI  Resp: no respiratory distress, tachypnea, or accessory muscle usage  Abd: soft, nd/nt no rebound or guarding.   Ext: no le edema or ttp  Neuro: AxOx3, speaking full sentences, face symmetric, eomi, no pronator drift, finger to nose intact b/l, sensation intact, steady gait to bathroom and back to room

## 2023-08-27 NOTE — ED ADULT NURSE NOTE - ED STAT RN HANDOFF DETAILS 2
Discharge instructions provided and verbalizes understanding of medication regimen and follow up care. Educational material provided. Denies any pain at this time. ambulatory with steady gait, denies dizziness.  by son.

## 2023-08-27 NOTE — ED PROVIDER NOTE - PRO INTERPRETER NEED 2
Airway  Urgency: elective    Date/Time: 7/2/2020 4:31 PM    General Information and Staff    Patient location during procedure: OR  Anesthesiologist: Render, Clint Ray, MD    Indications and Patient Condition  Indications for airway management: airway protection    Preoxygenated: yes  MILS maintained throughout  Mask difficulty assessment: 1 - vent by mask    Final Airway Details  Final airway type: endotracheal airway      Successful airway: ETT    Successful intubation technique: direct laryngoscopy  Blade: Contreras  Blade size: 4  ETT size (mm): 7.5  Cormack-Lehane Classification: grade IIb - view of arytenoids or posterior of glottis only  Placement verified by: chest auscultation and capnometry   Number of attempts at approach: 2               English

## 2023-08-27 NOTE — ED PROVIDER NOTE - NSFOLLOWUPINSTRUCTIONS_ED_ALL_ED_FT
You were evaluated in the ER for dizziness. Your EKG, blood tests, and CT brain show no acute abnormalities. Keep hydrated and folow-up with your primary care doctor. Call referral given for a neurology appointment. Medication has been sent to your pharmacy to aid with symptoms. return to the ER for new or worsening symptoms. You were evaluated in the ER for dizziness. Your EKG, blood tests, and CT brain show no acute abnormalities. Keep hydrated and follow-up with your primary care doctor. Call referral given for a neurology appointment. Medication has been sent to your pharmacy to aid with symptoms. return to the ER for new or worsening symptoms.

## 2024-04-06 NOTE — ED PROVIDER NOTE - CONSTITUTIONAL NEGATIVE STATEMENT, MLM
no fever and no chills.
Patient requests all Lab, Cardiology, and Radiology Results on their Discharge Instructions

## 2024-05-06 ENCOUNTER — RX ONLY (RX ONLY)
Age: 64
End: 2024-05-06

## 2024-05-09 ENCOUNTER — OFFICE (OUTPATIENT)
Facility: LOCATION | Age: 64
Setting detail: OPHTHALMOLOGY
End: 2024-05-09
Payer: COMMERCIAL

## 2024-05-09 DIAGNOSIS — H43.393: ICD-10-CM

## 2024-05-09 DIAGNOSIS — H40.1131: ICD-10-CM

## 2024-05-09 DIAGNOSIS — H25.13: ICD-10-CM

## 2024-05-09 PROBLEM — H16.223 DRY EYE SYNDROME K SICCA; BOTH EYES: Status: ACTIVE | Noted: 2024-05-09

## 2024-05-09 PROCEDURE — 92133 CPTRZD OPH DX IMG PST SGM ON: CPT | Performed by: OPHTHALMOLOGY

## 2024-05-09 PROCEDURE — 92014 COMPRE OPH EXAM EST PT 1/>: CPT | Performed by: OPHTHALMOLOGY

## 2024-05-09 ASSESSMENT — CONFRONTATIONAL VISUAL FIELD TEST (CVF)
OD_FINDINGS: FULL
OS_FINDINGS: FULL

## 2024-07-18 NOTE — ED PROVIDER NOTE - IV ALTEPLASE EXCL REL HIDDEN
[facility]   Physical Therapy Initial Evaluation      Name: Madan Cantu [facility]  Clinic Number: [id number]    Therapy Diagnosis:   Encounter Diagnoses   Name Primary?    Decreased functional mobility and endurance Yes    Chronic pain of left knee     Balance problems         Physician: Tashi Perkins III, *    Physician Orders: PT Eval and Treat   Medical Diagnosis from Referral:     Z96.651 (ICD-10-CM) - Status post right knee replacement   M17.12 (ICD-10-CM) - Primary osteoarthritis of left knee     Evaluation Date: 7/17/2024  Authorization Period Expiration: 04/25/2025   Plan of Care Expiration: 9/17/2024  Progress Note Due: 9/17/2024  Visit # / Visits authorized: 1/ 1   FOTO: 1/3    Precautions: Standard and Fall     Time In: 1540  Time Out: 1630  Total Appointment Time (timed & untimed codes): 50 minutes    Subjective     Date of onset: chronic    History of current condition - Madan reports:     Patient reports history of multiple issues leading to functional mobility and balance difficulties.    Of note she has had dizziness for ~3 years. These symptoms come on randomly, but can be exacerbated with supine positioning and C/S extension. States she has been cleared in general from cardiac, ENT, and neurological conditions. In general she feels unsteady due to this along with general mobility. She has not fallen, but currently is using a rolling walker for longer distances. She is able to go around her house, by touching on furniture.    She also reports onset of LBP in the last year that is exacerbated with prolonged standing activities along with L knee pain due to arthritis. Patient has a PMH of R TKA as well. Back and knee pain has led to    Per MD Note 4/25/24:    The patient is an 82-year-old female who presents for an annual checkup. She is accompanied by an adult male.     The patient underwent a right knee replacement on 06/14/2021 and is experiencing significant  arthritis in her left knee. Her last consultation was in 2023, during which she was advised to return in 5 years. She reports minimal knee pain, however, she experiences difficulty rising from a seated position, which she attributes to weakness rather than pain. Her primary focus is on her balance, and she utilizes a walker for balance. She maintains an active lifestyle, walking within a [place] downstairs of her house and in her yard when the weather permits. Her right knee does not inhibit her daily activities, although she maintains an active lifestyle. She performs daily bathroom cleaning and avoids squatting and kneeling. She experienced a severe episode of dizziness following her last dizzy spell, which necessitated the use of a walker. She denies any pain in her left knee.    Falls: 0    Imaging:     X-ray Knee:    FINDINGS:  Right knee again shows postoperative findings from total knee arthroplasty with prostheses in stable position and alignment.  Left knee again shows degenerative joint disease with the marginal spurring at several positions and narrowing of the patellofemoral and lateral tibiofemoral joint spaces.  Some cartilage calcifications are also noted.  The patella is mildly subluxed laterally.     Impression:     No acute abnormality.  Right TKA.  Left knee DJD.    MRI L/S:    FINDINGS:  Alignment: Grade 2 anterolisthesis of L4 on L5.     Vertebrae: No fracture or marrow infiltrative process.  T12 vertebral hemangioma noted.     Discs: Multilevel disc desiccation without significant height loss.     Cord: Conus terminates at L1-L2 and appears unremarkable.  Cauda equina appears unremarkable.  2.2 cm sacral Tarlov cyst.     Degenerative findings:     T12-L1: Central/right paracentral disc protrusion and mild facet arthropathy.  No significant spinal canal stenosis or neural foraminal narrowing.     L1-L2: Circumferential disc bulge and mild facet arthropathy.  No spinal canal stenosis or neural  foraminal narrowing.     L2-L3: Circumferential disc bulge, mild facet arthropathy, and thickening of the ligamentum flavum.  Mild spinal canal stenosis.  Mild right neural foraminal narrowing.     L3-L4: Mild circumferential disc bulge, moderate facet arthropathy, and thickening of the ligamentum flavum.  Mild spinal canal stenosis.  No significant neural foraminal narrowing.     L4-L5: Uncovering of the disc, moderate facet arthropathy, and thickening of the ligamentum flavum.  Severe spinal canal stenosis.  Mild bilateral there foraminal narrowing.     L5-S1: Mild facet arthropathy.  Mild bilateral neural foraminal narrowing.  No spinal canal stenosis.     Paraspinal muscles & soft tissues: Unremarkable.     Impression:     1. Multilevel degenerative changes of the lumbar spine, with severe spinal canal stenosis/grade 2 anterolisthesis at L4-L5.    Prior Therapy: yes post TKA  Social History: lives alone  Occupation: retired  Prior Level of Function: chronic  Current Level of Function: slowly having increased difficulty with sit to stand, stairs, and standing for long periods of time.    Pain:  Current 3/10, worst 5/10, best 2/10   Location: robinson knee + low back  Description: Aching and Dull; dizziness  Aggravating Factors: Standing, Walking, Extension, Getting out of bed/chair, and Stairs  Easing Factors: nothing and rest    Patients goals: Improve functional mobility sharon sit to stand + stairs     Medical History:   Past Medical History:   Diagnosis Date    A-fib     Anticoagulant long-term use     Anxiety     Arrhythmia     Arthritis     knees    Basal cell carcinoma     Breast cyst     Community acquired pneumonia of right lower lobe of lung 12/11/2018    Deep vein thrombosis     one DVT during  pregnancy    Encephalopathy 2/10/2021    Fibrocystic breast 1980 - ??    Heart murmur     Hypercholesterolemia 6/13/2017    Hyperlipidemia     Hypertension     Hypothyroidism     Mitral valve disorder     Mitral valve  prolapse     PVC's (premature ventricular contractions)     Squamous cell carcinoma bx 7-2017    right upper forehead    Thyroid cancer 1/29/2013    Thyroid disease     Vasovagal near syncope 11/12/2012    Vasovagal near syncope 11/12/2012    VTE (venous thromboembolism)     in 1960s, post partum, pt unsure of details     Weakness 12/11/2018       Surgical History:   Madan Bell  has a past surgical history that includes Thyroid surgery; Hysterectomy; Breast cyst aspiration (Bilateral, 1980's - ??); Oophorectomy (hysterectomy - 2000 - ??); Breast cyst excision (Right, 2008 - ??); Breast biopsy (Left); Breast biopsy (Left); Cardioversion (N/A, 08/13/2018); Cataract extraction w/  intraocular lens implant (Left, 04/22/2021); Cataract extraction w/  intraocular lens implant (Right, 05/17/2021); and Knee Arthroplasty (Right, 06/14/2021).    Medications:   Madan has a current medication list which includes the following prescription(s): alendronate, ascorbic acid (vitamin c), atorvastatin, bisoprolol-hydrochlorothiazide 2.5-6.25 mg, calcium citrate, cephalexin, cholecalciferol (vitamin d3), cranberry extract, cyanocobalamin, d-mannose, eliquis, estradiol, furosemide, ketoconazole, ultimate andrea probiotic, levothyroxine, omega-3 fatty acids-vitamin e, pantoprazole, potassium chloride, arexvy (pf), vitamin b complex, and vitamin e.    Allergies:   Review of patient's allergies indicates:   Allergen Reactions    Fluoride Hives     Other reaction(s): Hives    Fluoride preparations Hives    Amiodarone analogues Rash    Amoxicillin-pot clavulanate Diarrhea    Nitrofurantoin monohyd/m-cryst Nausea Only and Other (See Comments)        Objective      Observation / Gait: shuffle gait pattern with poor hip extension    Posture: WFL and PPT in standing + sitting leading to slight slouched forward posture    Sensation: WFL    Edema: mild L knee    Lumbar Range of Motion: Deferred    Hip Range of Motion:     Limitations Pain   Flexion ~100 deg robinson   -; posterior thigh stretch        Extension np   np        ER at 90 WFL -        IR at 90 WFL -            Range of Motion (Passive):   Knee Right  Left    Flexion 125 135   Extension 0 Lacking 5 deg     Lower Extremity Strength:  Right LE  Left LE    Quadriceps: 4/5 Quadriceps: 4/5 * pain   Hamstrings: 4/5 Hamstrings: 4-/5   Hip Flexion: 3-/5 Hip Flexion: 3/5   Hip Extension:  3/5 Hip Extension: 3/5   Hip ER:  3+/5 Hip ER:  3+/5     Joint Mobility: Deferred     Flexibility:    90/90 Hamstrings: R = +++ ; L = +++     Functional tests:   30 sec sit to stand: 3 reps - use of UE on thighs      Limitation/Restriction for FOTO Knee Survey    Therapist reviewed FOTO scores for Madan Bell on 7/17/2024.   FOTO documents entered into [facility] - see Media section.    Limitation Score: see media%       Treatment     Total Treatment time (time-based codes) separate from Evaluation: 20 minutes     Madan received the treatments listed below:      neuromuscular re-education activities to improve: Balance, Coordination, Kinesthetic, Sense, Proprioception, and Posture for 20 minutes. The following activities were included:    Patient education:  - impairments  - importance of quad NM activation for knee function  - importance of posterior chain sharon glute NM activation for sit to stand and stairs    SLR x10 ea robinson - cuing for quad NM activation  Bridge x10  Supine clam GTB 10x5 sec hold      NEXT VISIT:  Review above HEP (3x10 ea of them)  SAQ 5lbs 3x10-20 ea robinson  LAQ 5lbs 3x10-20 ea robinson  Progress to leg press DL 20-40 lbs if able    Future: balance activities + lateral walking    therapeutic activities to improve functional performance for 00  minutes, including:    NT    Patient Education and Home Exercises     Education provided:   - see above    Written Home Exercises Provided: yes. Exercises were reviewed and Madan was able to demonstrate them prior to the end of the  session.  Madan demonstrated good  understanding of the education provided. See EMR under Patient Instructions for exercises provided during therapy sessions.    Assessment     Madan is a 82 y.o. female referred to outpatient Physical Therapy with a medical diagnosis of Z96.651 (ICD-10-CM) - Status post right knee replacement and M17.12 (ICD-10-CM) - Primary osteoarthritis of left knee. Patient presents with pain, muscular flexibility deficits sharon of hamstrings, proprioceptive deficits, functional strength deficits, and LE NM activation and strength deficits most pronounced in quads and hip extensors. Impairments contributory to poor eric to functional activities including sit to stand, stairs, and standing.    Patient prognosis is Fair.   Patient will benefit from skilled outpatient Physical Therapy to address the deficits stated above and in the chart below, provide patient /family education, and to maximize patientt's level of independence.     Plan of care discussed with patient: Yes  Patient's spiritual, cultural and educational needs considered and patient is agreeable to the plan of care and goals as stated below:     Anticipated Barriers for therapy: chronicity, age, a-fib, dizziness    Medical Necessity is demonstrated by the following  History  Co-morbidities and personal factors that may impact the plan of care [] LOW: no personal factors / co-morbidities  [] MODERATE: 1-2 personal factors / co-morbidities  [x] HIGH: 3+ personal factors / co-morbidities    Moderate / High Support Documentation:   Co-morbidities affecting plan of care: chronicity, age, a-fib, dizziness    Personal Factors:   See above     Examination  Body Structures and Functions, activity limitations and participation restrictions that may impact the plan of care [] LOW: addressing 1-2 elements  [] MODERATE: 3+ elements  [x] HIGH: 4+ elements (please support below)    Moderate / High Support Documentation: pain, muscular flexibility  deficits sharon of hamstrings, proprioceptive deficits, functional strength deficits, and LE NM activation and strength deficits most pronounced in quads and hip extensors.     Clinical Presentation [] LOW: stable  [x] MODERATE: Evolving  [] HIGH: Unstable     Decision Making/ Complexity Score: moderate       GOALS: Short Term Goals:  0-4 weeks  1.Report decreased knee and back pain  < / =  3/10  to increase tolerance for ADLs  2. 30 sec sit to stand >/= 5 reps  3. Increase strength by 1/3 MMT grade in quad and hip extensors  to increase tolerance for ADL and work activities.  4. Pt to tolerate HEP to improve ROM and independence with ADL's    Long Term Goals: 5-8 weeks  1.30 sec sit to stand >/= 7 reps  2.Patient goal: ADLs with min complaints.  3.Increase strength to >/= 4+/5 in quad and hip extensors  to increase tolerance for ADL and work activities.  4. Pt will report at CJ level (20-40% impaired) on FOTO knee to demonstrate increase in LE function with every day tasks.     Plan     Plan of care Certification: 7/17/2024 to 9/17/2024.    Outpatient Physical Therapy 1-2 times weekly for 8 weeks to include the following interventions: Manual Therapy, Moist Heat/ Ice, Neuromuscular Re-ed, Patient Education, Self Care, Therapeutic Activities, and Therapeutic Exercise.     POPEYE SESAY, PT, DPT, OCS   show

## 2024-08-20 ENCOUNTER — OFFICE (OUTPATIENT)
Facility: LOCATION | Age: 64
Setting detail: OPHTHALMOLOGY
End: 2024-08-20
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
## 2024-08-20 PROCEDURE — NO SHOW FE NO SHOW FEE: Performed by: OPTOMETRIST

## 2024-09-27 ENCOUNTER — OFFICE (OUTPATIENT)
Facility: LOCATION | Age: 64
Setting detail: OPHTHALMOLOGY
End: 2024-09-27
Payer: COMMERCIAL

## 2024-09-27 DIAGNOSIS — H16.223: ICD-10-CM

## 2024-09-27 DIAGNOSIS — H40.1131: ICD-10-CM

## 2024-09-27 PROCEDURE — 99213 OFFICE O/P EST LOW 20 MIN: CPT | Performed by: OPTOMETRIST

## 2024-09-27 PROCEDURE — 92250 FUNDUS PHOTOGRAPHY W/I&R: CPT | Performed by: OPTOMETRIST

## 2024-09-27 PROCEDURE — 92083 EXTENDED VISUAL FIELD XM: CPT | Performed by: OPTOMETRIST

## 2024-09-27 ASSESSMENT — CONFRONTATIONAL VISUAL FIELD TEST (CVF)
OS_FINDINGS: FULL
OD_FINDINGS: FULL

## 2024-12-13 ENCOUNTER — OFFICE (OUTPATIENT)
Facility: LOCATION | Age: 64
Setting detail: OPHTHALMOLOGY
End: 2024-12-13
Payer: COMMERCIAL

## 2024-12-13 DIAGNOSIS — H16.223: ICD-10-CM

## 2024-12-13 DIAGNOSIS — H40.1131: ICD-10-CM

## 2024-12-13 PROCEDURE — 92012 INTRM OPH EXAM EST PATIENT: CPT | Performed by: OPTOMETRIST

## 2024-12-13 ASSESSMENT — KERATOMETRY
OS_K2POWER_DIOPTERS: 44.75
OS_AXISANGLE_DEGREES: 097
OD_K1POWER_DIOPTERS: 43.50
OD_K2POWER_DIOPTERS: 44.50
OD_AXISANGLE_DEGREES: 090
OS_K1POWER_DIOPTERS: 43.50
METHOD_AUTO_MANUAL: MANUAL

## 2024-12-13 ASSESSMENT — REFRACTION_MANIFEST
OD_AXIS: 063
OS_VA1: 20/20-2
OD_SPHERE: +3.75
OS_CYLINDER: -0.25
OD_CYLINDER: -0.25
OS_SPHERE: +4.00
OS_AXIS: 159
OD_VA1: 20/20

## 2024-12-13 ASSESSMENT — DECREASING TEAR LAKE - SEVERITY SCORE
OD_DEC_TEARLAKE: 1+
OS_DEC_TEARLAKE: 1+

## 2024-12-13 ASSESSMENT — REFRACTION_AUTOREFRACTION
OD_SPHERE: +3.75
OS_SPHERE: +4.00
OS_AXIS: 159
OD_AXIS: 063
OS_CYLINDER: -0.25
OD_CYLINDER: -0.25

## 2024-12-13 ASSESSMENT — TONOMETRY
OS_IOP_MMHG: 17
OD_IOP_MMHG: 17

## 2024-12-13 ASSESSMENT — VISUAL ACUITY
OD_BCVA: 20/50+2
OS_BCVA: 20/60

## 2025-03-21 ENCOUNTER — OFFICE (OUTPATIENT)
Facility: LOCATION | Age: 65
Setting detail: OPHTHALMOLOGY
End: 2025-03-21
Payer: COMMERCIAL

## 2025-03-21 DIAGNOSIS — H40.1131: ICD-10-CM

## 2025-03-21 DIAGNOSIS — H16.223: ICD-10-CM

## 2025-03-21 PROCEDURE — 92012 INTRM OPH EXAM EST PATIENT: CPT | Performed by: OPTOMETRIST

## 2025-03-21 ASSESSMENT — REFRACTION_AUTOREFRACTION
OS_AXIS: 121
OS_SPHERE: +4.00
OD_AXIS: 003
OD_CYLINDER: -0.25
OD_SPHERE: +3.75
OS_CYLINDER: -0.25

## 2025-03-21 ASSESSMENT — REFRACTION_MANIFEST
OS_CYLINDER: -0.25
OS_SPHERE: +4.00
OS_AXIS: 121
OD_SPHERE: +3.75
OS_VA1: 20/20-2
OD_CYLINDER: -0.25
OD_VA1: 20/20-2
OD_AXIS: 003

## 2025-03-21 ASSESSMENT — KERATOMETRY
OS_K2POWER_DIOPTERS: 44.75
OS_K1POWER_DIOPTERS: 43.50
METHOD_AUTO_MANUAL: MANUAL
OD_AXISANGLE_DEGREES: 090
OD_K1POWER_DIOPTERS: 43.50
OD_K2POWER_DIOPTERS: 44.50
OS_AXISANGLE_DEGREES: 097

## 2025-03-21 ASSESSMENT — VISUAL ACUITY
OD_BCVA: 20/50+2
OS_BCVA: 20/60

## 2025-03-21 ASSESSMENT — DECREASING TEAR LAKE - SEVERITY SCORE
OS_DEC_TEARLAKE: 1+
OD_DEC_TEARLAKE: 1+

## 2025-03-21 ASSESSMENT — TONOMETRY
OS_IOP_MMHG: 17
OD_IOP_MMHG: 17

## 2025-06-17 ENCOUNTER — OFFICE (OUTPATIENT)
Facility: LOCATION | Age: 65
Setting detail: OPHTHALMOLOGY
End: 2025-06-17
Payer: COMMERCIAL

## 2025-06-17 DIAGNOSIS — H40.1131: ICD-10-CM

## 2025-06-17 DIAGNOSIS — H43.393: ICD-10-CM

## 2025-06-17 DIAGNOSIS — H25.13: ICD-10-CM

## 2025-06-17 DIAGNOSIS — H16.223: ICD-10-CM

## 2025-06-17 PROCEDURE — 92014 COMPRE OPH EXAM EST PT 1/>: CPT | Performed by: OPTOMETRIST

## 2025-06-17 PROCEDURE — 92133 CPTRZD OPH DX IMG PST SGM ON: CPT | Performed by: OPTOMETRIST

## 2025-06-17 PROCEDURE — 92202 OPSCPY EXTND ON/MAC DRAW: CPT | Performed by: OPTOMETRIST

## 2025-06-17 ASSESSMENT — REFRACTION_MANIFEST
OD_AXIS: 175
OD_CYLINDER: -0.25
OD_SPHERE: +3.50
OS_VA1: 20/25+1
OS_CYLINDER: SPH
OS_SPHERE: +3.75
OD_VA1: 20/25+1

## 2025-06-17 ASSESSMENT — REFRACTION_CURRENTRX
OS_OVR_VA: 20/
OS_SPHERE: +3.50
OD_ADD: +2.25
OD_AXIS: 010
OD_OVR_VA: 20/
OD_SPHERE: +3.75
OS_ADD: +2.50
OS_CYLINDER: -0.25
OD_CYLINDER: -0.50
OS_AXIS: 172

## 2025-06-17 ASSESSMENT — DECREASING TEAR LAKE - SEVERITY SCORE
OD_DEC_TEARLAKE: 1+
OS_DEC_TEARLAKE: 1+

## 2025-06-17 ASSESSMENT — KERATOMETRY
OS_K2POWER_DIOPTERS: 44.25
METHOD_AUTO_MANUAL: MANUAL
OD_K1POWER_DIOPTERS: 43.50
OS_K1POWER_DIOPTERS: 43.00
OS_AXISANGLE_DEGREES: 103
OD_K2POWER_DIOPTERS: 44.50
OD_AXISANGLE_DEGREES: 090

## 2025-06-17 ASSESSMENT — REFRACTION_AUTOREFRACTION
OS_CYLINDER: SPH
OS_SPHERE: +3.75
OD_SPHERE: +3.50
OD_CYLINDER: -0.25
OD_AXIS: 175

## 2025-06-17 ASSESSMENT — TONOMETRY
OS_IOP_MMHG: 15
OD_IOP_MMHG: 15

## 2025-06-17 ASSESSMENT — CONFRONTATIONAL VISUAL FIELD TEST (CVF)
OS_FINDINGS: FULL
OD_FINDINGS: FULL

## 2025-06-17 ASSESSMENT — VISUAL ACUITY
OD_BCVA: 20/50+1
OS_BCVA: 20/100+2